# Patient Record
Sex: FEMALE | Race: WHITE | NOT HISPANIC OR LATINO | Employment: FULL TIME | URBAN - METROPOLITAN AREA
[De-identification: names, ages, dates, MRNs, and addresses within clinical notes are randomized per-mention and may not be internally consistent; named-entity substitution may affect disease eponyms.]

---

## 2017-01-25 ENCOUNTER — OFFICE VISIT (OUTPATIENT)
Dept: FAMILY MEDICINE | Facility: CLINIC | Age: 22
End: 2017-01-25
Payer: COMMERCIAL

## 2017-01-25 VITALS — OXYGEN SATURATION: 99 % | HEART RATE: 57 BPM | SYSTOLIC BLOOD PRESSURE: 117 MMHG | DIASTOLIC BLOOD PRESSURE: 67 MMHG

## 2017-01-25 DIAGNOSIS — F41.1 GAD (GENERALIZED ANXIETY DISORDER): Primary | ICD-10-CM

## 2017-01-25 PROCEDURE — 99203 OFFICE O/P NEW LOW 30 MIN: CPT | Performed by: PHYSICIAN ASSISTANT

## 2017-01-25 RX ORDER — CITALOPRAM HYDROBROMIDE 20 MG/10ML
10 SOLUTION, ORAL ORAL DAILY
Qty: 300 ML | Refills: 0 | Status: SHIPPED | OUTPATIENT
Start: 2017-01-25 | End: 2023-10-17

## 2017-01-25 ASSESSMENT — ANXIETY QUESTIONNAIRES
6. BECOMING EASILY ANNOYED OR IRRITABLE: NOT AT ALL
5. BEING SO RESTLESS THAT IT IS HARD TO SIT STILL: NOT AT ALL
GAD7 TOTAL SCORE: 10
1. FEELING NERVOUS, ANXIOUS, OR ON EDGE: MORE THAN HALF THE DAYS
3. WORRYING TOO MUCH ABOUT DIFFERENT THINGS: MORE THAN HALF THE DAYS
7. FEELING AFRAID AS IF SOMETHING AWFUL MIGHT HAPPEN: MORE THAN HALF THE DAYS
2. NOT BEING ABLE TO STOP OR CONTROL WORRYING: MORE THAN HALF THE DAYS

## 2017-01-25 ASSESSMENT — PATIENT HEALTH QUESTIONNAIRE - PHQ9: 5. POOR APPETITE OR OVEREATING: MORE THAN HALF THE DAYS

## 2017-01-25 NOTE — MR AVS SNAPSHOT
After Visit Summary   1/25/2017    Tamia Perez    MRN: [id number]           Patient Information     Date Of Birth          1995        Visit Information        Provider Department      1/25/2017 3:00 PM Latoya Bender PA-C [facility]        Today's Diagnoses     OMER (generalized anxiety disorder)    -  1       Care Instructions      Treating Anxiety Disorders with Therapy  If you have an anxiety disorder, you don t have to suffer anymore. Treatment is available. Therapy (also called counseling) is often a helpful treatment for anxiety disorders. With therapy, a specially trained professional (therapist) helps you face and learn to manage your anxiety. Therapy can be short-term or long-term depending on your needs. In some cases, medication may also be prescribed with therapy. It may take time before you notice how much therapy is helping, but stick with it. With therapy, you can feel better.  Cognitive behavioral therapy (CBT)  Cognitive behavioral therapy (CBT) teaches you to manage anxiety. It does this by helping you understand how you think and act when you re anxious. Research has shown CBT to be a very effective treatment for anxiety disorders. How CBT is run is almost like a class. It involves homework and activities to build skills that teach you to cope with anxiety step by step. It can be done in a group or one-on-one, and often takes place for a set number of sessions. CBT has two main parts:    Cognitive therapy helps you identify the negative, irrational thoughts that occur with your anxiety. You ll learn to replace these with more positive, realistic thoughts.    Behavioral therapy helps you change how you react to anxiety. You ll learn coping skills and methods for relaxing to help you better deal with anxiety.  Other forms of therapy  Other therapy methods may work better for you than CBT. Or, you may move from CBT to another form of therapy as your  treatment needs change. This may mean meeting with a therapist by yourself or in a group. Therapy can also help you work through problems in your life, such as drug or alcohol dependence, that may be making your anxiety worse.  Getting better takes time  Therapy will help you feel better and teach you skills to help manage anxiety long term. But change doesn t happen right away. It takes a commitment from you. And treatment only works if you learn to face the causes of your anxiety. So, you might feel worse before you feel better. This can sometimes make it hard to stick with it. But remember: Therapy is a very effective treatment. The results will be well worth it.  Helping yourself  If anxiety is wearing you down, here are some things you can do to cope:    Don t fight your feelings. Anxiety feeds itself. The more you worry about it, the worse it gets. Instead, try to identify what might have triggered your anxiety. Then try to put this threat in perspective.    Keep in mind that you can t control everything about a situation. Change what you can and let the rest take its course.    Exercise -- it s a great way to relieve tension and help your body feel relaxed.    Examine your life for stress, and try to find ways to reduce it.    Avoid caffeine and nicotine, which can make anxiety symptoms worse.    Fight the temptation to turn to alcohol or unprescribed drugs for relief. They only make things worse in the long run.     2647-1885 [employer]. [address] [place] [address]. All rights reserved. This information is not intended as a substitute for professional medical care. Always follow your healthcare professional's instructions.              Follow-ups after your visit        Who to contact     If you have questions or need follow up information about today's clinic visit or your schedule please contact [facility] directly at [phone].  Normal or non-critical lab  "and imaging results will be communicated to you by MyChart, letter or phone within 4 business days after the clinic has received the results. If you do not hear from us within 7 days, please contact the clinic through [employer] or phone. If you have a critical or abnormal lab result, we will notify you by phone as soon as possible.  Submit refill requests through [employer] or call your pharmacy and they will forward the refill request to us. Please allow 3 business days for your refill to be completed.          Additional Information About Your Visit        [employer] Information     [employer] lets you send messages to your doctor, view your test results, renew your prescriptions, schedule appointments and more. To sign up, go to [email or web address] . Click on \"Log in\" on the left side of the screen, which will take you to the Welcome page. Then click on \"Sign up Now\" on the right side of the page.     You will be asked to enter the access code listed below, as well as some personal information. Please follow the directions to create your username and password.     Your access code is: [email or web address]  Expires: 2017  3:44 PM     Your access code will  in 90 days. If you need help or a new code, please call your [place] clinic or [phone].        Care EveryWhere ID     This is your Care EveryWhere ID. This could be used by other organizations to access your [facility] medical records  [id number]        Your Vitals Were     Pulse Pulse Oximetry                57 99%           Blood Pressure from Last 3 Encounters:   17 117/67    Weight from Last 3 Encounters:   11/15/16 140 lb (63.504 kg)   10/04/16 140 lb (63.504 kg)              Today, you had the following     No orders found for display         Today's Medication Changes          These changes are accurate as of: 17  3:44 PM.  If you have any questions, ask your nurse or doctor.               Start taking these medicines.        Dose/Directions "    citalopram 10 MG/5ML solution   Commonly known as:  celeXA   Used for:  OMER (generalized anxiety disorder)   Started by:  Ltaoya Bender PA-C        Dose:  10 mg   Take 5 mLs (10 mg) by mouth daily   Quantity:  300 mL   Refills:  0            Where to get your medicines      These medications were sent to [facility]/pharmacy #[id number] - [place], [place] - [address]  [address], [facility] [address]     Phone:  [phone]    - citalopram 10 MG/5ML solution             Primary Care Provider    Pcp Unknown Verified       No address on file        Thank you!     Thank you for choosing [facility]  for your care. Our goal is always to provide you with excellent care. Hearing back from our patients is one way we can continue to improve our services. Please take a few minutes to complete the written survey that you may receive in the mail after your visit with us. Thank you!             Your Updated Medication List - Protect others around you: Learn how to safely use, store and throw away your medicines at www.disposemymeds.org.          This list is accurate as of: 1/25/17  3:44 PM.  Always use your most recent med list.                   Brand Name Dispense Instructions for use    citalopram 10 MG/5ML solution    celeXA    300 mL    Take 5 mLs (10 mg) by mouth daily

## 2017-01-25 NOTE — NURSING NOTE
"Chief Complaint   Patient presents with     Anxiety     Referral       Initial /67 mmHg  Pulse 57  Wt   SpO2 99% Estimated body mass index is 22.61 kg/(m^2) as calculated from the following:    Height as of 11/15/16: 5' 6\" (1.676 m).    Weight as of 11/15/16: 140 lb (63.504 kg).  BP completed using cuff size: regular    Sara Newbloom CMA    "

## 2017-01-25 NOTE — PATIENT INSTRUCTIONS
Treating Anxiety Disorders with Therapy  If you have an anxiety disorder, you don t have to suffer anymore. Treatment is available. Therapy (also called counseling) is often a helpful treatment for anxiety disorders. With therapy, a specially trained professional (therapist) helps you face and learn to manage your anxiety. Therapy can be short-term or long-term depending on your needs. In some cases, medication may also be prescribed with therapy. It may take time before you notice how much therapy is helping, but stick with it. With therapy, you can feel better.  Cognitive behavioral therapy (CBT)  Cognitive behavioral therapy (CBT) teaches you to manage anxiety. It does this by helping you understand how you think and act when you re anxious. Research has shown CBT to be a very effective treatment for anxiety disorders. How CBT is run is almost like a class. It involves homework and activities to build skills that teach you to cope with anxiety step by step. It can be done in a group or one-on-one, and often takes place for a set number of sessions. CBT has two main parts:    Cognitive therapy helps you identify the negative, irrational thoughts that occur with your anxiety. You ll learn to replace these with more positive, realistic thoughts.    Behavioral therapy helps you change how you react to anxiety. You ll learn coping skills and methods for relaxing to help you better deal with anxiety.  Other forms of therapy  Other therapy methods may work better for you than CBT. Or, you may move from CBT to another form of therapy as your treatment needs change. This may mean meeting with a therapist by yourself or in a group. Therapy can also help you work through problems in your life, such as drug or alcohol dependence, that may be making your anxiety worse.  Getting better takes time  Therapy will help you feel better and teach you skills to help manage anxiety long term. But change doesn t happen right away. It  takes a commitment from you. And treatment only works if you learn to face the causes of your anxiety. So, you might feel worse before you feel better. This can sometimes make it hard to stick with it. But remember: Therapy is a very effective treatment. The results will be well worth it.  Helping yourself  If anxiety is wearing you down, here are some things you can do to cope:    Don t fight your feelings. Anxiety feeds itself. The more you worry about it, the worse it gets. Instead, try to identify what might have triggered your anxiety. Then try to put this threat in perspective.    Keep in mind that you can t control everything about a situation. Change what you can and let the rest take its course.    Exercise   it s a great way to relieve tension and help your body feel relaxed.    Examine your life for stress, and try to find ways to reduce it.    Avoid caffeine and nicotine, which can make anxiety symptoms worse.    Fight the temptation to turn to alcohol or unprescribed drugs for relief. They only make things worse in the long run.     0502-2084 [employer]. [address] [place] [address], [place] [address]. All rights reserved. This information is not intended as a substitute for professional medical care. Always follow your healthcare professional's instructions.

## 2017-01-25 NOTE — PROGRESS NOTES
SUBJECTIVE:                                                    Tamia Perez is a 21 year old female who presents to clinic today for the following health issues:    Abnormal Mood Symptoms     Onset: Ongoing     Description:   Depression: no  Anxiety: YES    Accompanying Signs & Symptoms:  Still participating in activities that you used to enjoy: YES  Fatigue: no  Irritability: no  Difficulty concentrating: YES  Changes in appetite: no  Problems with sleep: no  Heart racing/beating fast : YES- at times   Thoughts of hurting yourself or others: none     History:   Recent stress: YES  Prior depression hospitalization: None  Family history of depression: YES- sister  Family history of anxiety: YES      Precipitating factors:   Alcohol/drug use: no    Alleviating factors:  Running        Therapies Tried and outcome: None  Patient has not taken medication in the past. She cannot swallow pills.   She does not have SI/HI. Worries about things that are rational. Has trouble decision making. No excessive crying. She has a family history of anxiety. As a child she noted a stressful home life due to her mothers anxiety. She feels ashamed for going seeing me today, because she feels like she cannot tell her parents about her anxiety. She has been seeing a therapist who has urged her to go forward with medication. She denies a history of cutting or suicidal / homocidal ideations.       Problem list and histories reviewed & adjusted, as indicated.  Additional history: as documented    There is no problem list on file for this patient.    History reviewed. No pertinent past surgical history.    Social History   Substance Use Topics     Smoking status: Never Smoker      Smokeless tobacco: Not on file     Alcohol Use: No     Family History   Problem Relation Age of Onset     Skin Cancer Mother          No current outpatient prescriptions on file.     No Known Allergies     ROS: 10 point ROS neg other than the symptoms noted  above in the HPI.        OBJECTIVE:                                                    /67 mmHg  Pulse 57  Wt   SpO2 99%  There is no weight on file to calculate BMI.  GENERAL: healthy, alert and no distress  NECK: no adenopathy, no asymmetry, masses, or scars and thyroid normal to palpation  RESP: lungs clear to auscultation - no rales, rhonchi or wheezes  CV: regular rate and rhythm, normal S1 S2, no S3 or S4, no murmur, click or rub, no peripheral edema and peripheral pulses strong  MS: no gross musculoskeletal defects noted, no edema  PSYCH: mentation appears normal, affect normal/bright, tearful and anxious    Diagnostic Test Results:  none      ASSESSMENT/PLAN:                                                    1. OMER (generalized anxiety disorder)  Patient declines TSH test, she has anxiety of needles. Advised that she try Celexa for 2-3 weeks and monitor effects, if no effect should increase dose to 20mg daily. If no improvement at 6 weeks, try Lexapro. Do not want to start on Benzos as she has anxiety most of the time every day.   - citalopram (CELEXA) 10 MG/5ML solution; Take 5 mLs (10 mg) by mouth daily  Dispense: 300 mL; Refill: 0    Options for treatment and follow up care were discussed with the patient. Patient participated in decision making and agrees with treatment plan.     Latoya Bender PA-C  [facility]

## 2017-01-26 ASSESSMENT — ANXIETY QUESTIONNAIRES: GAD7 TOTAL SCORE: 10

## 2018-03-01 ENCOUNTER — CARE COORDINATION (OUTPATIENT)
Dept: GASTROENTEROLOGY | Facility: CLINIC | Age: 23
End: 2018-03-01

## 2018-03-01 NOTE — PROGRESS NOTES
Called  to lab. Pt having blood draw and fainted prior to rapid response call.  Pt in recliner in reclining position.    Pt had appt with human resources and sent to the lab to have tb quant gold drawn. Pt has been in rushed mode as went to the wrong building for her first appt prior to the lab.  Pt pale and hyperventilating upon my arrival.  bp 117/82 pulse 92 and sat 100.  Pt given cold pack to forehead.  Water and two orange juices and one apple juice. Also jerrod crackers.  Pt had not had breakfast this am.  Pt does take medication for anxiety. Did not take her medication this am. Pt did take propanol 10 mg now. Vitals now pulse 66 108/72  Sat 100.  Observed pt for 45 minutes.  Pt walked to lobby to await uber to go the [employer].

## 2022-01-21 ENCOUNTER — THERAPY VISIT (OUTPATIENT)
Dept: PHYSICAL THERAPY | Facility: CLINIC | Age: 27
End: 2022-01-21
Payer: COMMERCIAL

## 2022-01-21 DIAGNOSIS — M76.62 ACHILLES TENDINITIS OF BOTH LOWER EXTREMITIES: Primary | ICD-10-CM

## 2022-01-21 DIAGNOSIS — M76.61 ACHILLES TENDINITIS OF BOTH LOWER EXTREMITIES: Primary | ICD-10-CM

## 2022-01-21 PROCEDURE — 97161 PT EVAL LOW COMPLEX 20 MIN: CPT | Mod: GP | Performed by: PHYSICAL THERAPIST

## 2022-01-21 PROCEDURE — 97110 THERAPEUTIC EXERCISES: CPT | Mod: GP | Performed by: PHYSICAL THERAPIST

## 2022-01-21 NOTE — PROGRESS NOTES
Physical Therapy Initial Examination/Evaluation  January 21, 2022    Tamia Perez is a 26 year old female referred to physical therapy by self referred for treatment of B Achilles tendonitis with Precautions/Restrictions/MD instructions E&T    Therapist Impression:   Tamia presents with findings consistent with insertional Achilles tendinopathy.    Subjective:  DOI/onset: Chronic, September 2021 DOS: none  Acute Injury or Gradual Onset?: Gradual injury over time  Mechanism of Injury: Mohawk Dance  Related PMH: Hx of Achilles tendinopathy  Previous Treatment: Rest, Ice and rolling, stretching, Tylenol Effect of prior treatment: fair  Imaging: None  Chief Complaint/Functional Limitations:   Running, dancing and see below in therapy evaluation codes   Pain: rest 0 /10, activity 4/10 Location: Insertional Achilles Frequency: Intermittent Described as: stabbing, sharp and throbbing Alleviated by: see above Progression of Symptoms: Gradually getting worse. Time of day when pain is worse: Activity related  Sleeping: Interrupted due to current issue   Occupation: [employer]  Job duties: keyboarding/computer use  Current HEP/exercise regimen: Running,  Patient's goals are see chief complaints     Other pertinent PMH/Red Flags: Mental Illness   Barriers at home/work: None as reported by patient  Pertinent Surgical History: None  Medications: Anti-depressants  General health as reported by patient: fair  Return to MD:  prn    ANKLE EVALUATION    Static Posture  No obvious findings    Dynamic Movement Screen  Single leg stance observations: Eyes open no significant findings   Double limb squat observations: Impaired weight distribution  Single limb squat observations: Good technique/no significant findings  Gait: Not assessed    Ankle Range of Motion  Ankle AROM Dorsiflexion Plantarflexion Inversion Eversion WBing DF (cm)   Left wnl wnl wnl wnl 8   Right wnl wnl wnl wnl 10   **WBing DF- distance between wall and great toe  where pt can touch knee to wall and keep heel in contact with floor    Ankle Strength  Ankle MMT Dorsiflexion Plantarflexion Inversion Eversion   Left 5/5 5/5 5/5 5/5   Right 5/5 Pain /5 5/5 5/5     Palpation  Left: No tenderness to palpation  Right: Mild tenderness to palpation at calcaneus at Achilles insertion    Assessment/Plan:  Patient is a 26 year old female with both sides ankle complaints.    Patient has the following significant findings with corresponding treatment plan.                Diagnosis 1:  B Achilles tendonitis  Pain -  hot/cold therapy, manual therapy, splint/taping/bracing/orthotics, self management, education and home program  Decreased ROM/flexibility - manual therapy and therapeutic exercise  Decreased strength - therapeutic exercise and therapeutic activities    Therapy Evaluation Codes:   Cumulative Therapy Evaluation is: Low complexity.    Previous and current functional limitations:  (See Goal Flow Sheet for this information)    Short term and Long term goals: (See Goal Flow Sheet for this information)     Communication ability:  Patient appears to be able to clearly communicate and understand verbal and written communication and follow directions correctly.  Treatment Explanation - The following has been discussed with the patient:   RX ordered/plan of care  Anticipated outcomes  Possible risks and side effects  This patient would benefit from PT intervention to resume normal activities.   Rehab potential is good.    Frequency:  2 X a month, once daily  Duration:  for 3 months  Discharge Plan:  Achieve all LTG.  Independent in home treatment program.  Reach maximal therapeutic benefit.    Please refer to the daily flowsheet for treatment today, total treatment time and time spent performing 1:1 timed codes.

## 2022-02-16 ENCOUNTER — THERAPY VISIT (OUTPATIENT)
Dept: PHYSICAL THERAPY | Facility: CLINIC | Age: 27
End: 2022-02-16
Payer: COMMERCIAL

## 2022-02-16 DIAGNOSIS — M76.62 ACHILLES TENDINITIS OF BOTH LOWER EXTREMITIES: ICD-10-CM

## 2022-02-16 DIAGNOSIS — M76.61 ACHILLES TENDINITIS OF BOTH LOWER EXTREMITIES: ICD-10-CM

## 2022-02-16 PROCEDURE — 97530 THERAPEUTIC ACTIVITIES: CPT | Mod: GP | Performed by: PHYSICAL THERAPIST

## 2022-02-16 PROCEDURE — 97110 THERAPEUTIC EXERCISES: CPT | Mod: GP | Performed by: PHYSICAL THERAPIST

## 2022-02-16 NOTE — PROGRESS NOTES
Therapist Impression:   Progressed to isotonics.    GOALS: running    NEXT: Add weights, consider plyometric,  Consider running eval    PTRX: Handouts    Subjective:  Went for a run last night, stopping running because of shooting pain in L calf.      Objective:  Reports being a toe runner  Good technique with calf raises  Pain with isometrics single leg

## 2022-03-20 ENCOUNTER — HEALTH MAINTENANCE LETTER (OUTPATIENT)
Age: 27
End: 2022-03-20

## 2022-03-25 ENCOUNTER — THERAPY VISIT (OUTPATIENT)
Dept: PHYSICAL THERAPY | Facility: CLINIC | Age: 27
End: 2022-03-25
Payer: COMMERCIAL

## 2022-03-25 DIAGNOSIS — M76.62 ACHILLES TENDINITIS OF BOTH LOWER EXTREMITIES: ICD-10-CM

## 2022-03-25 DIAGNOSIS — M76.61 ACHILLES TENDINITIS OF BOTH LOWER EXTREMITIES: ICD-10-CM

## 2022-03-25 PROCEDURE — 97110 THERAPEUTIC EXERCISES: CPT | Mod: GP | Performed by: PHYSICAL THERAPIST

## 2022-03-25 PROCEDURE — 97530 THERAPEUTIC ACTIVITIES: CPT | Mod: GP | Performed by: PHYSICAL THERAPIST

## 2022-03-25 ASSESSMENT — ACTIVITIES OF DAILY LIVING (ADL)
TOTAL_ADL_ITEM_SCORE:: 48
ACTIVITIES_OF_DAILY_LIVING_MEASURE_SCORE(%):: 57.14
HIGHEST_POTENTIAL_ADL_SCORE:: 84

## 2022-03-25 NOTE — PROGRESS NOTES
Therapist Impression:   15 deg vs 8 deg of PF with R vs L foot strike, 3 deg vs 10 deg contralateral hip drop R vs L.  Advised to change running pattern, trying for more heelstike (see flowsheet).    GOALS: running    NEXT: Add weights, consider plyometric,  Consider running eval    PTRX: Handouts    Subjective:  Still having pain.  Tape worked very well.  Bought tape and didn't work as well.  Started to get good, but then started to not feel as good.  Running 3 x week    Objective:  See above for angle findings.    Toe strike running gait pattern.

## 2022-06-20 ENCOUNTER — THERAPY VISIT (OUTPATIENT)
Dept: PHYSICAL THERAPY | Facility: CLINIC | Age: 27
End: 2022-06-20
Payer: COMMERCIAL

## 2022-06-20 DIAGNOSIS — M76.61 ACHILLES TENDINITIS OF BOTH LOWER EXTREMITIES: Primary | ICD-10-CM

## 2022-06-20 DIAGNOSIS — M76.62 ACHILLES TENDINITIS OF BOTH LOWER EXTREMITIES: Primary | ICD-10-CM

## 2022-06-20 PROCEDURE — 97110 THERAPEUTIC EXERCISES: CPT | Mod: GP | Performed by: PHYSICAL THERAPIST

## 2022-06-20 PROCEDURE — 97530 THERAPEUTIC ACTIVITIES: CPT | Mod: GP | Performed by: PHYSICAL THERAPIST

## 2022-06-20 ASSESSMENT — ACTIVITIES OF DAILY LIVING (ADL)
HIGHEST_POTENTIAL_ADL_SCORE:: 84
TOTAL_ADL_ITEM_SCORE:: 84
ACTIVITIES_OF_DAILY_LIVING_MEASURE_SCORE(%):: 100

## 2022-09-11 ENCOUNTER — HEALTH MAINTENANCE LETTER (OUTPATIENT)
Age: 27
End: 2022-09-11

## 2023-05-06 ENCOUNTER — HEALTH MAINTENANCE LETTER (OUTPATIENT)
Age: 28
End: 2023-05-06

## 2023-10-17 ENCOUNTER — OFFICE VISIT (OUTPATIENT)
Dept: URGENT CARE | Facility: URGENT CARE | Age: 28
End: 2023-10-17
Payer: COMMERCIAL

## 2023-10-17 VITALS
OXYGEN SATURATION: 98 % | SYSTOLIC BLOOD PRESSURE: 109 MMHG | DIASTOLIC BLOOD PRESSURE: 73 MMHG | HEART RATE: 76 BPM | TEMPERATURE: 98.1 F | BODY MASS INDEX: 23.69 KG/M2 | WEIGHT: 146.8 LBS

## 2023-10-17 DIAGNOSIS — J06.9 VIRAL URI WITH COUGH: Primary | ICD-10-CM

## 2023-10-17 PROCEDURE — 87635 SARS-COV-2 COVID-19 AMP PRB: CPT | Performed by: EMERGENCY MEDICINE

## 2023-10-17 PROCEDURE — 99203 OFFICE O/P NEW LOW 30 MIN: CPT | Performed by: EMERGENCY MEDICINE

## 2023-10-17 RX ORDER — SERTRALINE HYDROCHLORIDE 100 MG/1
1.5 TABLET, FILM COATED ORAL
COMMUNITY
Start: 2023-10-11

## 2023-10-17 RX ORDER — PROPRANOLOL HYDROCHLORIDE 10 MG/1
10 TABLET ORAL PRN
COMMUNITY
Start: 2023-06-26

## 2023-10-17 NOTE — LETTER
October 17, 2023      Tamia YVONNE Ana  [address]   [place] [address]        To Whom It May Concern:    Tamia RUSSELL Ana  was seen on 10/17/2023.  Please excuse her  until 10/19/2023 due to illness.        Sincerely,        Trevon Ashton PA-C

## 2023-10-17 NOTE — PATIENT INSTRUCTIONS
Likely viral upper respiratory, influenza like illness.   Recommend Flonase nasal spray for the sinus congestion, 2 sprays in each nostril for 7-10 days.   Drink plenty of fluids and rest.  Over the counter pain relievers such as tylenol or ibuprofen may be used as needed.   Mucinex is product known to help loosen congestion and thin mucus (generic is guaifenesin)   Delsym 12 hour over the counter works well for cough.  Honey has been shown to be helpful in cough management. May add to lemon tea.  Please follow up with primary care provider if not improving, worsening or new symptoms.

## 2023-10-17 NOTE — PROGRESS NOTES
Assessment & Plan     Diagnosis:    (J06.9) Viral URI with cough  (primary encounter diagnosis)  Plan: Symptomatic COVID-19 Virus (Coronavirus) by PCR        Nose      Medical Decision Making:  Tamia Perez is a 28 year old female presents to clinic for evaluation of URI symptoms x5 days.   --Covid PCR pending, will call with any positive results.   --otherwise likely viral URI that will run its course.  --offered tessalon perles for cough management, she declined.   --provided reassurance and offered over the counter therapies including mucinex, Delsym, flonase nasal spray, ibuprofen and tylenol as needed.    Follow-up:   Patient was advised to return to clinic for reevaluation (either UC or PCP) if symptoms do not improve in 10 days. Patient educated on red flag symptoms and asked to go directly to the ED if these symptoms present themselves.         Physician Assistant Attestation       I saw and evaluated (Tamia Perez) as part of a shared APRN/PA visit.       I personally reviewed the vital signs.       I personally performed the substantive portion of the medical decision making for this visit - please see the PRINCESS's documentation for full details.         I personally performed the substantive portion of the physical exam - please see the PRINCESS's documentation for full details.       (Provider name and title: (Trevon Ashton PA-C)   Date of Service (when I saw the patient):  (10/17/23)       Lizzie Bolivar NP Student  Trevon Ashton PA-C  [facility]      Chief Complaint   Patient presents with    URI     X 5 days- exhausted- malaise- sore throat on thurs- sinuses, body aches, coughing- hurts chest.    Neg covid on thurs-        Subjective   HPI   Tamia Perez is a 28 year old female who presents with 5 days of URI symptoms including malaise, body aches, sinus congestion, and productive cough. She reports having a sore throat initially as well which has since resolved. Coughing up  green/clear sputum intermittently. She tested negative for covid via home antigen test last Thursday. She reports feeling out of breath when she is up moving around for prolonged periods of time and some mild chest pain with coughing fits. Has not taken her temperature at home. She has been taking mucinex and tylenol over the counter with some relief. Denies chills, shortness of breath at rest, chest pain outside of coughing spells.       No past medical history on file.  Current Outpatient Medications   Medication Sig Dispense Refill    propranolol (INDERAL) 10 MG tablet Take 10 mg by mouth as needed      sertraline (ZOLOFT) 100 MG tablet Take 1.5 tablets by mouth daily at 2 pm       Social History     Tobacco Use    Smoking status: Never    Smokeless tobacco: Not on file   Substance Use Topics    Alcohol use: No      No Known Allergies      Review of Systems   Review of Systems   All systems negative except for those listed above in HPI.        Objective    Temp: 98.1  F (36.7  C) Temp src: Tympanic BP: 109/73 Pulse: 76     SpO2: 98 %       Physical Exam   Physical Exam  Vitals reviewed.   Constitutional:       Appearance: Normal appearance.   HENT:      Head: Normocephalic.      Right Ear: Tympanic membrane normal.      Left Ear: Tympanic membrane normal.      Nose: No congestion.      Mouth/Throat:      Mouth: Mucous membranes are moist.      Pharynx: Oropharynx is clear. No posterior oropharyngeal erythema.   Eyes:      Extraocular Movements: Extraocular movements intact.   Cardiovascular:      Rate and Rhythm: Normal rate and regular rhythm.      Pulses: Normal pulses.      Heart sounds: Normal heart sounds.   Pulmonary:      Effort: Pulmonary effort is normal.      Breath sounds: Normal breath sounds.   Abdominal:      General: There is no distension.      Palpations: Abdomen is soft.      Tenderness: There is no abdominal tenderness. There is no right CVA tenderness or left CVA tenderness.   Musculoskeletal:          General: Normal range of motion.      Cervical back: Neck supple. No rigidity.   Lymphadenopathy:      Cervical: No cervical adenopathy.   Skin:     General: Skin is warm and dry.      Capillary Refill: Capillary refill takes less than 2 seconds.   Neurological:      General: No focal deficit present.      Mental Status: She is alert.   Psychiatric:         Mood and Affect: Mood normal.         Behavior: Behavior normal.        Covid test pending.           Lizzie Bolivar NP Student  [facility]

## 2023-10-18 LAB — SARS-COV-2 RNA RESP QL NAA+PROBE: NEGATIVE

## 2024-07-13 ENCOUNTER — HEALTH MAINTENANCE LETTER (OUTPATIENT)
Age: 29
End: 2024-07-13

## 2025-07-19 ENCOUNTER — HEALTH MAINTENANCE LETTER (OUTPATIENT)
Age: 30
End: 2025-07-19